# Patient Record
Sex: FEMALE | Race: BLACK OR AFRICAN AMERICAN | NOT HISPANIC OR LATINO | Employment: STUDENT | URBAN - METROPOLITAN AREA
[De-identification: names, ages, dates, MRNs, and addresses within clinical notes are randomized per-mention and may not be internally consistent; named-entity substitution may affect disease eponyms.]

---
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
## 2024-01-19 ENCOUNTER — TELEPHONE (OUTPATIENT)
Dept: OBSTETRICS AND GYNECOLOGY | Facility: CLINIC | Age: 19
End: 2024-01-19
Payer: MEDICAID

## 2024-01-19 NOTE — TELEPHONE ENCOUNTER
Attempted to call patient to reschedule appointment. Patient did not answer, and voicemail was unable to be left due to patient's phone not being in service.

## 2024-01-23 ENCOUNTER — OFFICE VISIT (OUTPATIENT)
Dept: OBSTETRICS AND GYNECOLOGY | Facility: CLINIC | Age: 19
End: 2024-01-23
Payer: MEDICAID

## 2024-01-23 ENCOUNTER — LAB VISIT (OUTPATIENT)
Dept: LAB | Facility: HOSPITAL | Age: 19
End: 2024-01-23
Attending: NURSE PRACTITIONER
Payer: MEDICAID

## 2024-01-23 VITALS
DIASTOLIC BLOOD PRESSURE: 68 MMHG | WEIGHT: 149.94 LBS | BODY MASS INDEX: 22.21 KG/M2 | HEIGHT: 69 IN | SYSTOLIC BLOOD PRESSURE: 106 MMHG

## 2024-01-23 DIAGNOSIS — N76.0 BV (BACTERIAL VAGINOSIS): ICD-10-CM

## 2024-01-23 DIAGNOSIS — N76.2 ACUTE VULVITIS: ICD-10-CM

## 2024-01-23 DIAGNOSIS — Z30.42 ENCOUNTER FOR DEPO-PROVERA CONTRACEPTION: ICD-10-CM

## 2024-01-23 DIAGNOSIS — Z11.3 SCREENING FOR STD (SEXUALLY TRANSMITTED DISEASE): ICD-10-CM

## 2024-01-23 DIAGNOSIS — B96.89 BV (BACTERIAL VAGINOSIS): ICD-10-CM

## 2024-01-23 DIAGNOSIS — Z11.3 SCREENING FOR STD (SEXUALLY TRANSMITTED DISEASE): Primary | ICD-10-CM

## 2024-01-23 PROCEDURE — 99214 OFFICE O/P EST MOD 30 MIN: CPT | Mod: S$PBB,,, | Performed by: NURSE PRACTITIONER

## 2024-01-23 PROCEDURE — 1159F MED LIST DOCD IN RCRD: CPT | Mod: CPTII,,, | Performed by: NURSE PRACTITIONER

## 2024-01-23 PROCEDURE — 3078F DIAST BP <80 MM HG: CPT | Mod: CPTII,,, | Performed by: NURSE PRACTITIONER

## 2024-01-23 PROCEDURE — 1160F RVW MEDS BY RX/DR IN RCRD: CPT | Mod: CPTII,,, | Performed by: NURSE PRACTITIONER

## 2024-01-23 PROCEDURE — 36415 COLL VENOUS BLD VENIPUNCTURE: CPT | Performed by: NURSE PRACTITIONER

## 2024-01-23 PROCEDURE — 87389 HIV-1 AG W/HIV-1&-2 AB AG IA: CPT | Performed by: NURSE PRACTITIONER

## 2024-01-23 PROCEDURE — 99999PBSHW PR PBB SHADOW TECHNICAL ONLY FILED TO HB: Mod: PBBFAC,,,

## 2024-01-23 PROCEDURE — 3074F SYST BP LT 130 MM HG: CPT | Mod: CPTII,,, | Performed by: NURSE PRACTITIONER

## 2024-01-23 PROCEDURE — 99999 PR PBB SHADOW E&M-EST. PATIENT-LVL III: CPT | Mod: PBBFAC,,, | Performed by: NURSE PRACTITIONER

## 2024-01-23 PROCEDURE — 80074 ACUTE HEPATITIS PANEL: CPT | Performed by: NURSE PRACTITIONER

## 2024-01-23 PROCEDURE — 3008F BODY MASS INDEX DOCD: CPT | Mod: CPTII,,, | Performed by: NURSE PRACTITIONER

## 2024-01-23 PROCEDURE — 87491 CHLMYD TRACH DNA AMP PROBE: CPT | Performed by: NURSE PRACTITIONER

## 2024-01-23 PROCEDURE — 86592 SYPHILIS TEST NON-TREP QUAL: CPT | Performed by: NURSE PRACTITIONER

## 2024-01-23 PROCEDURE — 99213 OFFICE O/P EST LOW 20 MIN: CPT | Mod: PBBFAC | Performed by: NURSE PRACTITIONER

## 2024-01-23 PROCEDURE — 87210 SMEAR WET MOUNT SALINE/INK: CPT | Mod: PBBFAC | Performed by: NURSE PRACTITIONER

## 2024-01-23 RX ORDER — METRONIDAZOLE 500 MG/1
500 TABLET ORAL EVERY 12 HOURS
Qty: 14 TABLET | Refills: 0 | Status: SHIPPED | OUTPATIENT
Start: 2024-01-23 | End: 2024-01-30

## 2024-01-23 RX ORDER — MEDROXYPROGESTERONE ACETATE 150 MG/ML
150 INJECTION, SUSPENSION INTRAMUSCULAR
Status: ACTIVE | OUTPATIENT
Start: 2024-01-29 | End: 2025-04-22

## 2024-01-23 NOTE — PROGRESS NOTES
"Rachelle Peters is a 18 y.o. female  presents with complaint of vaginal discharge for 3 weeks.  She reports itching.  denies odor.  She states the discharge is white.    Wants full STD testing done   Wants to get back on depo provera - has been off for a year    LMP 2024    History reviewed. No pertinent past medical history.  History reviewed. No pertinent surgical history.  Social History     Tobacco Use    Smoking status: Never    Smokeless tobacco: Never   Substance Use Topics    Alcohol use: Never    Drug use: Never     History reviewed. No pertinent family history.  OB History    Para Term  AB Living   1 0 0 0 1 0   SAB IAB Ectopic Multiple Live Births   1 0 0 0 0      # Outcome Date GA Lbr David/2nd Weight Sex Delivery Anes PTL Lv   1 SAB                /68 (BP Location: Left arm, Patient Position: Sitting, BP Method: Small (Manual))   Ht 5' 9" (1.753 m)   Wt 68 kg (149 lb 14.6 oz)   LMP 2024   BMI 22.14 kg/m²     ROS:  Per hpi      PHYSICAL EXAM:  VULVA: normal appearing vulva with no masses, tenderness or lesions, VAGINA: vaginal discharge - white, WET MOUNT done - results: clue cells, CERVIX: normal appearing cervix without discharge or lesions, DNA probe for chlamydia and GC obtained  Physical Exam     ASSESSMENT and PLAN:  1. Screening for STD (sexually transmitted disease)  C. trachomatis/N. gonorrhoeae by AMP DNA    HIV 1/2 Ag/Ab (4th Gen)    Hepatitis Panel, Acute    RPR    POCT Wet Prep      2. Acute vulvitis  POCT Wet Prep    metroNIDAZOLE (FLAGYL) 500 MG tablet      3. BV (bacterial vaginosis)  metroNIDAZOLE (FLAGYL) 500 MG tablet      4. Encounter for Depo-Provera contraception  POCT Urine Pregnancy    medroxyPROGESTERone (DEPO-PROVERA) injection 150 mg        Rachelle was seen today for std check.    Diagnoses and all orders for this visit:    Screening for STD (sexually transmitted disease)  -     C. trachomatis/N. gonorrhoeae by AMP DNA  -     HIV 1/2 " Ag/Ab (4th Gen); Future  -     Hepatitis Panel, Acute; Future  -     RPR; Future  -     POCT Wet Prep    Acute vulvitis  -     POCT Wet Prep  -     metroNIDAZOLE (FLAGYL) 500 MG tablet; Take 1 tablet (500 mg total) by mouth every 12 (twelve) hours. for 7 days    BV (bacterial vaginosis)  -     metroNIDAZOLE (FLAGYL) 500 MG tablet; Take 1 tablet (500 mg total) by mouth every 12 (twelve) hours. for 7 days    Encounter for Depo-Provera contraception  -     POCT Urine Pregnancy; Future  -     medroxyPROGESTERone (DEPO-PROVERA) injection 150 mg     Call when on cycle for depo injection with negative upt

## 2024-01-24 LAB — RPR SER QL: NORMAL

## 2024-01-31 ENCOUNTER — TELEPHONE (OUTPATIENT)
Dept: OBSTETRICS AND GYNECOLOGY | Facility: CLINIC | Age: 19
End: 2024-01-31
Payer: MEDICAID

## 2024-01-31 ENCOUNTER — PATIENT MESSAGE (OUTPATIENT)
Dept: OBSTETRICS AND GYNECOLOGY | Facility: CLINIC | Age: 19
End: 2024-01-31
Payer: MEDICAID

## 2024-01-31 LAB
C TRACH DNA SPEC QL NAA+PROBE: NOT DETECTED
N GONORRHOEA DNA SPEC QL NAA+PROBE: NOT DETECTED

## 2024-01-31 NOTE — TELEPHONE ENCOUNTER
----- Message from Bethany Preston sent at 1/31/2024 12:47 PM CST -----  Contact: Rachelle [facility] calling to schedule appointment for depo shot. Patient was told to call when her cycle start. Please call back  [phone]

## 2024-01-31 NOTE — TELEPHONE ENCOUNTER
Please call pt to schedule a depo provera injection and upt at the [facility] as she is on her cycle   Orders are in for both

## 2024-02-02 ENCOUNTER — CLINICAL SUPPORT (OUTPATIENT)
Dept: OBSTETRICS AND GYNECOLOGY | Facility: CLINIC | Age: 19
End: 2024-02-02
Payer: MEDICAID

## 2024-02-02 DIAGNOSIS — Z30.9 ENCOUNTER FOR CONTRACEPTIVE MANAGEMENT, UNSPECIFIED TYPE: Primary | ICD-10-CM

## 2024-02-02 LAB
B-HCG UR QL: NEGATIVE
CTP QC/QA: YES

## 2024-02-02 PROCEDURE — 96372 THER/PROPH/DIAG INJ SC/IM: CPT | Mod: PBBFAC,PN

## 2024-02-02 PROCEDURE — 81025 URINE PREGNANCY TEST: CPT | Mod: PBBFAC,PN

## 2024-02-02 PROCEDURE — 99999PBSHW POCT URINE PREGNANCY: Mod: PBBFAC,,,

## 2024-02-02 PROCEDURE — 99999PBSHW PR PBB SHADOW TECHNICAL ONLY FILED TO HB: Mod: PBBFAC,,,

## 2024-02-02 RX ADMIN — MEDROXYPROGESTERONE ACETATE 150 MG: 150 INJECTION, SUSPENSION INTRAMUSCULAR at 11:02

## 2024-02-02 NOTE — PROGRESS NOTES
After having negative UPT and using two patient identifiers allergies and medications were reviewed and depo provera injection was given IM to Left Ventrogluteal. Instructed to wait 15 minutes. Made next appointment.

## 2024-09-03 ENCOUNTER — PATIENT MESSAGE (OUTPATIENT)
Dept: OBSTETRICS AND GYNECOLOGY | Facility: CLINIC | Age: 19
End: 2024-09-03
Payer: MEDICAID

## 2024-10-03 ENCOUNTER — E-VISIT (OUTPATIENT)
Dept: FAMILY MEDICINE | Facility: CLINIC | Age: 19
End: 2024-10-03
Payer: MEDICAID

## 2024-10-03 DIAGNOSIS — L70.9 ACNE, UNSPECIFIED ACNE TYPE: Primary | ICD-10-CM

## 2024-10-03 DIAGNOSIS — L98.9 SKIN LESION: ICD-10-CM

## 2024-10-03 RX ORDER — CLINDAMYCIN PHOSPHATE AND BENZOYL PEROXIDE 10; 50 MG/G; MG/G
GEL TOPICAL
Qty: 45 G | Refills: 2 | Status: SHIPPED | OUTPATIENT
Start: 2024-10-03

## 2024-10-03 RX ORDER — DOXYCYCLINE HYCLATE 100 MG
100 TABLET ORAL DAILY
Qty: 14 TABLET | Refills: 0 | Status: SHIPPED | OUTPATIENT
Start: 2024-10-03 | End: 2024-10-17

## 2024-10-03 NOTE — PROGRESS NOTES
Patient ID: Rachelle Peters is a 19 y.o. female.    Chief Complaint: General Illness (Entered automatically based on patient selection in [employer].)          274}  The patient initiated a request through [employer] on 10/3/2024 for evaluation and management with a chief complaint of General Illness (Entered automatically based on patient selection in [employer].)     I evaluated the questionnaire submission on 10/04/2024 .    Total Time (in minutes): 16     [facility]-Skin Hair Nails    10/3/2024 12:21 PM CDT - Filed by Patient   What do you need help with? Skin   What concern do you have about your skin? Scar   Do you agree to participate in an E-Visit? Yes   If you have any of the following symptoms, please present to your local emergency room or call 911:  I acknowledge   Are you pregnant, could you be pregnant, or are you breast feeding? None of the above   What is the main issue you would like addressed today? Acne , dark spots   How would you describe your skin problem? Growth   When did your symptoms first appear? 7/22/2023   Where is it located?  Face   Does it itch? Yes   Does it hurt? No   Is there discharge or drainage? No   Is there bleeding? Yes   Describe the character Spots   Describe the color Black   Has it changed over time? Shrunk in size   Frequency of skin problem Monthly   Duration of the skin problem (how long does it stay when it is present) Hours   I have had a new exposure to No new exposures   I have had a new exposure to No new exposures   What have you used to treat the skin problem? None   If you have used anything for treatment, has it helped the symptoms? Maybe   Other generalized symptoms that you associate with the rash No other symptoms   Provide any additional information you feel is important. Just need to get some cream to get rid of acne   At least one photo is required for treatment to be provided. You can upload a maximum of three photos of the affected area.      Are you able to take your vital signs? No          Active Problem List with Overview Notes    Diagnosis Date Noted    Irregular periods 09/12/2022      Recent Labs Obtained:  Lab Results   Component Value Date    WBC 6.51 04/06/2022    HGB 7.4 (L) 04/06/2022    HCT 31.0 (L) 04/06/2022    MCV 67 (L) 04/06/2022     (H) 04/06/2022     04/06/2022    K 3.7 04/06/2022    GLU 70 04/06/2022    CREATININE 0.8 04/06/2022    HGBA1C 4.7 04/06/2022    TSH 1.853 04/06/2022      Review of patient's allergies indicates:  No Known Allergies    Encounter Diagnoses   Name Primary?    Acne, unspecified acne type Yes    Skin lesion         Orders Placed This Encounter   Procedures    Ambulatory referral/consult to Dermatology     Standing Status:   Future     Standing Expiration Date:   11/3/2025     Referral Priority:   Routine     Referral Type:   Consultation     Referral Reason:   Specialty Services Required     Requested Specialty:   Dermatology     Number of Visits Requested:   1    E-Consult to Dermatology     Order Specific Question:   Consent has been obtained from patient, and patient is aware of applicable cost? Pending specialist evaluation, I will follow up with the patient.     Answer:   Yes     Order Specific Question:   Medical Condition:     Answer:   Acne     Order Specific Question:   What is your clinical question?     Answer:   Pt has hyperpigmented spots from acne any recommended medication to help with dark spots/scarring     Order Specific Question:   Total time spent preparing for the referral and/or communicating with the consulting physician:     Answer:   <16 minutes: no billing code should be submitted      Medications Ordered This Encounter   Medications    clindamycin-benzoyl peroxide gel     Sig: AAA face qam to bid     Dispense:  45 g     Refill:  2    doxycycline (VIBRA-TABS) 100 MG tablet     Sig: Take 1 tablet (100 mg total) by mouth once daily. for 14 days     Dispense:  14 tablet      Refill:  0    hydroquinone 4 % Crea     Sig: Apply sparingly to affected areas of face BID prn dark spots. Do not use for longer than 16 weeks.     Dispense:  30 g     Refill:  1        E-Visit Time Tracking:    Day 1 Time (in minutes): 12  Day 2 Time (in minutes): 4    Total Time (in minutes): 16      274}

## 2024-10-04 ENCOUNTER — E-CONSULT (OUTPATIENT)
Dept: DERMATOLOGY | Facility: CLINIC | Age: 19
End: 2024-10-04
Payer: MEDICAID

## 2024-10-04 DIAGNOSIS — L81.9 POST-INFLAMMATORY PIGMENTARY CHANGES: Primary | ICD-10-CM

## 2024-10-04 RX ORDER — HYDROQUINONE 40 MG/G
CREAM TOPICAL
Qty: 30 G | Refills: 1 | Status: SHIPPED | OUTPATIENT
Start: 2024-10-04

## 2024-10-04 NOTE — CONSULTS
[address]  Response for E-Consult     Patient Name: Rachelle Peters  MRN: [id number]  Primary Care Provider: No, Primary Doctor   Requesting Provider: Don Patel MD  Consults    Recommendation: Management of post inflammatory pigment alteration (PIPA):  - Strict sun protection (daily application of at least SPF 30, broad spectrum, ideally a tinted physical blocker (zinc or titanium as the active ingredient), with frequent reapplication (every 2 hours if outside), such as EltaMD UV Clear SPF 46- Tinted, or CeraVe Tinted Sunscreen SPF 30.    - can also try a lightening cream with hydroquinone, such as prescription hydroquinone 4% cream, applied BID for 2 months on, then must take at least 1 month off to prevent risk of ochronosis.  Do not use continuously.  - prevention of additional PIPA by treating acne      Contingency that warrants a repeat eConsult or referral: no improvement with above recommendations    Total time of Consultation: 5 minute    I did not speak to the requesting provider verbally about this.     *This eConsult is based on the clinical data available to me and is furnished without benefit of a physical examination. The eConsult will need to be interpreted in light of any clinical issues or changes in patient status not available to me at the time of filing this eConsults. Significant changes in patient condition or level of acuity should result in immediate formal consultation and reevaluation. Please alert me if you have further questions.    Thank you for this eConsult referral.     Theresa Donaldson MD  [address]

## 2024-10-15 ENCOUNTER — LAB VISIT (OUTPATIENT)
Dept: LAB | Facility: HOSPITAL | Age: 19
End: 2024-10-15
Attending: NURSE PRACTITIONER
Payer: MEDICAID

## 2024-10-15 ENCOUNTER — OFFICE VISIT (OUTPATIENT)
Dept: OBSTETRICS AND GYNECOLOGY | Facility: CLINIC | Age: 19
End: 2024-10-15
Payer: MEDICAID

## 2024-10-15 VITALS
HEIGHT: 69 IN | DIASTOLIC BLOOD PRESSURE: 66 MMHG | WEIGHT: 147.25 LBS | SYSTOLIC BLOOD PRESSURE: 114 MMHG | BODY MASS INDEX: 21.81 KG/M2

## 2024-10-15 DIAGNOSIS — Z11.3 SCREENING FOR STD (SEXUALLY TRANSMITTED DISEASE): ICD-10-CM

## 2024-10-15 DIAGNOSIS — Z11.3 SCREENING FOR STD (SEXUALLY TRANSMITTED DISEASE): Primary | ICD-10-CM

## 2024-10-15 DIAGNOSIS — R35.0 URINE FREQUENCY: ICD-10-CM

## 2024-10-15 LAB
BACTERIA #/AREA URNS HPF: ABNORMAL /HPF
BILIRUB UR QL STRIP: NEGATIVE
CLARITY UR: CLEAR
COLOR UR: YELLOW
GLUCOSE UR QL STRIP: NEGATIVE
HAV IGM SERPL QL IA: NORMAL
HBV CORE IGM SERPL QL IA: NORMAL
HBV SURFACE AG SERPL QL IA: NORMAL
HCV AB SERPL QL IA: NORMAL
HGB UR QL STRIP: ABNORMAL
HIV 1+2 AB+HIV1 P24 AG SERPL QL IA: NORMAL
KETONES UR QL STRIP: NEGATIVE
LEUKOCYTE ESTERASE UR QL STRIP: NEGATIVE
MICROSCOPIC COMMENT: ABNORMAL
NITRITE UR QL STRIP: NEGATIVE
PH UR STRIP: 7 [PH] (ref 5–8)
PROT UR QL STRIP: NEGATIVE
RBC #/AREA URNS HPF: 16 /HPF (ref 0–4)
SP GR UR STRIP: 1.02 (ref 1–1.03)
SQUAMOUS #/AREA URNS HPF: 2 /HPF
TREPONEMA PALLIDUM IGG+IGM AB [PRESENCE] IN SERUM OR PLASMA BY IMMUNOASSAY: NONREACTIVE
URN SPEC COLLECT METH UR: ABNORMAL
WBC #/AREA URNS HPF: 1 /HPF (ref 0–5)

## 2024-10-15 PROCEDURE — 87086 URINE CULTURE/COLONY COUNT: CPT | Performed by: NURSE PRACTITIONER

## 2024-10-15 PROCEDURE — 3008F BODY MASS INDEX DOCD: CPT | Mod: CPTII,,, | Performed by: NURSE PRACTITIONER

## 2024-10-15 PROCEDURE — 1159F MED LIST DOCD IN RCRD: CPT | Mod: CPTII,,, | Performed by: NURSE PRACTITIONER

## 2024-10-15 PROCEDURE — 1160F RVW MEDS BY RX/DR IN RCRD: CPT | Mod: CPTII,,, | Performed by: NURSE PRACTITIONER

## 2024-10-15 PROCEDURE — 87591 N.GONORRHOEAE DNA AMP PROB: CPT | Performed by: NURSE PRACTITIONER

## 2024-10-15 PROCEDURE — 80074 ACUTE HEPATITIS PANEL: CPT | Performed by: NURSE PRACTITIONER

## 2024-10-15 PROCEDURE — 81000 URINALYSIS NONAUTO W/SCOPE: CPT | Performed by: NURSE PRACTITIONER

## 2024-10-15 PROCEDURE — 87389 HIV-1 AG W/HIV-1&-2 AB AG IA: CPT | Performed by: NURSE PRACTITIONER

## 2024-10-15 PROCEDURE — 3074F SYST BP LT 130 MM HG: CPT | Mod: CPTII,,, | Performed by: NURSE PRACTITIONER

## 2024-10-15 PROCEDURE — 86593 SYPHILIS TEST NON-TREP QUANT: CPT | Performed by: NURSE PRACTITIONER

## 2024-10-15 PROCEDURE — 99999 PR PBB SHADOW E&M-EST. PATIENT-LVL II: CPT | Mod: PBBFAC,,, | Performed by: NURSE PRACTITIONER

## 2024-10-15 PROCEDURE — 99213 OFFICE O/P EST LOW 20 MIN: CPT | Mod: S$PBB,,, | Performed by: NURSE PRACTITIONER

## 2024-10-15 PROCEDURE — 36415 COLL VENOUS BLD VENIPUNCTURE: CPT | Performed by: NURSE PRACTITIONER

## 2024-10-15 PROCEDURE — 99212 OFFICE O/P EST SF 10 MIN: CPT | Mod: PBBFAC | Performed by: NURSE PRACTITIONER

## 2024-10-15 PROCEDURE — 3078F DIAST BP <80 MM HG: CPT | Mod: CPTII,,, | Performed by: NURSE PRACTITIONER

## 2024-10-15 PROCEDURE — 87491 CHLMYD TRACH DNA AMP PROBE: CPT | Performed by: NURSE PRACTITIONER

## 2024-10-15 NOTE — PROGRESS NOTES
"Rachelle Peters is a 19 y.o. female  presents for STD testing and some frequency with urination x 2 days    Patient's last menstrual period was 2024.    History reviewed. No pertinent past medical history.  History reviewed. No pertinent surgical history.  Social History     Tobacco Use    Smoking status: Never    Smokeless tobacco: Never   Substance Use Topics    Alcohol use: Never    Drug use: Never     No family history on file.  OB History    Para Term  AB Living   1 0 0 0 1 0   SAB IAB Ectopic Multiple Live Births   1 0 0 0 0      # Outcome Date GA Lbr David/2nd Weight Sex Type Anes PTL Lv   1 SAB                /66   Ht 5' 9" (1.753 m)   Wt 66.8 kg (147 lb 4.3 oz)   LMP 2024   BMI 21.75 kg/m²     ROS:  Per hpi    PHYSICAL EXAM:  VULVA: normal appearing vulva with no masses, tenderness or lesions, VAGINA: normal appearing vagina with normal color and discharge, no lesions, CERVIX: normal appearing cervix without discharge or lesions, DNA probe for chlamydia and GC obtained, UTERUS: uterus is normal size, shape, consistency and nontender, ADNEXA: normal adnexa in size, nontender and no masses  Physical Exam     ASSESSMENT and PLAN:  1. Screening for STD (sexually transmitted disease)  C. trachomatis/N. gonorrhoeae by AMP DNA    Treponema Pallidium Antibodies IgG, IgM    Hepatitis Panel, Acute    HIV 1/2 Ag/Ab (4th Gen)      2. Urine frequency  Urinalysis    CULTURE, URINE          Rachelle was seen today for std check.    Diagnoses and all orders for this visit:    Screening for STD (sexually transmitted disease)  -     C. trachomatis/N. gonorrhoeae by AMP DNA  -     Treponema Pallidium Antibodies IgG, IgM; Future  -     Hepatitis Panel, Acute; Future  -     HIV 1/2 Ag/Ab (4th Gen); Future    Urine frequency  -     Urinalysis  -     CULTURE, URINE         Patient was counseled today on vaginitis prevention including :  a. avoiding feminine products such as deoderant " soaps, body wash, bubble bath, douches, scented toilet paper, deoderant tampons or pads, feminine wipes, chronic pad use, etc.  b. avoiding other vulvovaginal irritants such as long hot baths, humidity, tight, synthetic clothing, chlorine and sitting around in wet bathing suits  c. wearing cotton underwear, avoiding thong underwear and no underwear to bed  d. taking showers instead of baths and use a hair dryer on cool setting afterwards to dry  e. wearing cotton to exercise and shower immediately after exercise and change clothes  f. using polyurethane condoms without spermicide if sexually active and symptoms are triggered by intercourse

## 2024-10-17 LAB
BACTERIA UR CULT: NORMAL
C TRACH DNA SPEC QL NAA+PROBE: NOT DETECTED
N GONORRHOEA DNA SPEC QL NAA+PROBE: NOT DETECTED

## 2024-10-22 ENCOUNTER — E-VISIT (OUTPATIENT)
Dept: FAMILY MEDICINE | Facility: CLINIC | Age: 19
End: 2024-10-22
Payer: MEDICAID

## 2024-10-22 DIAGNOSIS — L81.9 POST-INFLAMMATORY PIGMENTARY CHANGES: Primary | ICD-10-CM

## 2024-10-22 RX ORDER — HYDROQUINONE 40 MG/G
CREAM TOPICAL
Qty: 30 G | Refills: 1 | Status: SHIPPED | OUTPATIENT
Start: 2024-10-22

## 2024-10-22 NOTE — PROGRESS NOTES
Patient ID: Rachelle Peters is a 19 y.o. female.    Chief Complaint: General Illness (Entered automatically based on patient selection in [employer].)          274}  The patient initiated a request through [employer] on 10/22/2024 for evaluation and management with a chief complaint of General Illness (Entered automatically based on patient selection in [employer].)     I evaluated the questionnaire submission on 10/22/2024 .    Total Time (in minutes): 8     [facility] P[facility]-Skin Hair Nails    10/22/2024  9:00 AM CDT - Filed by Patient   What do you need help with? Skin   What concern do you have about your skin? Acne   Do you agree to participate in an E-Visit? Yes   If you have any of the following symptoms, please present to your local emergency room or call 911:  I acknowledge   Are you pregnant, could you be pregnant, or are you breast feeding? None of the above   What is the main issue you would like addressed today? Darks spots breaking out hyperpigmentation   How would you describe your skin problem? Rash   When did your symptoms first appear? 10/5/2024   Where is it located?  Face   Does it itch? Yes   Does it hurt? No   Is there discharge or drainage? No   Is there bleeding? No   Describe the character Spots;  Scaly   Describe the color Black   Has it changed over time? Spread to other locations   Frequency of skin problem Weekly   Duration of the skin problem (how long does it stay when it is present) Never goes away   I have had a new exposure to No new exposures   I have had a new exposure to No new exposures   What have you used to treat the skin problem? Benzoyal , t   If you have used anything for treatment, has it helped the symptoms? Maybe   Other generalized symptoms that you associate with the rash No other symptoms   Provide any additional information you feel is important. Darks spots , different color skin   At least one photo is required for treatment to be provided. You can upload a  maximum of three photos of the affected area.     Are you able to take your vital signs? No          Active Problem List with Overview Notes    Diagnosis Date Noted    Irregular periods 09/12/2022      Recent Labs Obtained:  Lab Results   Component Value Date    WBC 6.51 04/06/2022    HGB 7.4 (L) 04/06/2022    HCT 31.0 (L) 04/06/2022    MCV 67 (L) 04/06/2022     (H) 04/06/2022     04/06/2022    K 3.7 04/06/2022    GLU 70 04/06/2022    CREATININE 0.8 04/06/2022    HGBA1C 4.7 04/06/2022    TSH 1.853 04/06/2022      Review of patient's allergies indicates:  No Known Allergies    Encounter Diagnosis   Name Primary?    Post-inflammatory pigmentary changes Yes        No orders of the defined types were placed in this encounter.     Medications Ordered This Encounter   Medications    hydroquinone 4 % Crea     Sig: Apply sparingly to affected areas of face BID prn dark spots. Do not use for longer than 16 weeks.     Dispense:  30 g     Refill:  1        E-Visit Time Tracking:    Day 1 Time (in minutes): 8    Total Time (in minutes): 8      274}

## 2025-01-24 ENCOUNTER — E-VISIT (OUTPATIENT)
Dept: FAMILY MEDICINE | Facility: CLINIC | Age: 20
End: 2025-01-24
Payer: MEDICAID

## 2025-01-24 DIAGNOSIS — N30.90 CYSTITIS WITHOUT HEMATURIA: Primary | ICD-10-CM

## 2025-01-24 PROCEDURE — 99423 OL DIG E/M SVC 21+ MIN: CPT | Mod: ,,, | Performed by: STUDENT IN AN ORGANIZED HEALTH CARE EDUCATION/TRAINING PROGRAM

## 2025-01-24 RX ORDER — SULFAMETHOXAZOLE AND TRIMETHOPRIM 800; 160 MG/1; MG/1
1 TABLET ORAL 2 TIMES DAILY
Qty: 14 TABLET | Refills: 0 | Status: SHIPPED | OUTPATIENT
Start: 2025-01-24 | End: 2025-01-31

## 2025-01-24 NOTE — PROGRESS NOTES
Patient ID: Rachelle Peters is a 19 y.o. female.    Chief Complaint: General Illness (Entered automatically based on patient selection in [employer].)          274}  The patient initiated a request through [employer] on 1/24/2025 for evaluation and management with a chief complaint of General Illness (Entered automatically based on patient selection in [employer].)     I evaluated the questionnaire submission on 01/24/2025 .    Total Time (in minutes): 11     [facility]    1/24/2025  8:36 AM CST - Filed by Patient   What do you need help with? Urinary Symptoms   Do you agree to participate in an E-Visit? Yes   If you have any of the following symptoms, please present to your local emergency room or call 911:  I acknowledge   Do you have any of the following pregnancy-related conditions? None   What is the main issue you would like addressed today? Peeing alot , back to back   What symptoms do you currently have? Change in urine appearance or smell   When did your symptoms first appear? 12/20/2025   List what you have done or taken to help your symptoms. Drink water   Please indicate whether you have had the following symptoms during the past 24 hours     Urgent urination (a sudden and uncontrollable urge to urinate) Moderate   Frequent urination of small amounts of urine (going to the toilet very often) Moderate   Burning pain when urinating Mild   Incomplete bladder emptying (still feel like you need to urinate again after urination) Moderate   Pain not associated with urination in the lower abdomen below the belly button) Mild   What does your urine look like? Cloudy   Blood seen in the urine None   Flank pain (pain in one or both sides of the lower back) Mild   Abnormal Vaginal Discharge (abnormal amount, color and/or odor) Mild   Discharge from the urethra (urinary opening) without urination Mild   High body temperature/fever? None-<99.5   Please rate how much discomfort you have  experience because of the symptoms in the past 24 hours: Mild   Please indicate how the symptoms have interfered with your every day activities/work in the past 24 hours: Mild   Please indicate how these symptoms have interfered with your social activities (visiting people, meeting with friends, etc.) in the past 24 hours? Mild   Are you a diabetic? No   Please indicate whether you have the following at the time of completion of this questionnaire: None of the above   Provide any additional information you feel is important. Jusr been peeing alot and , and pee smell odor   Please attach any relevant images or files (if you have performed a home test for UTI, please submit a photo of results)    Are you able to take your vital signs? No          Active Problem List with Overview Notes    Diagnosis Date Noted    Irregular periods 09/12/2022      Recent Labs Obtained:  Lab Results   Component Value Date    WBC 6.51 04/06/2022    HGB 7.4 (L) 04/06/2022    HCT 31.0 (L) 04/06/2022    MCV 67 (L) 04/06/2022     (H) 04/06/2022     04/06/2022    K 3.7 04/06/2022    GLU 70 04/06/2022    CREATININE 0.8 04/06/2022    HGBA1C 4.7 04/06/2022    TSH 1.853 04/06/2022      Review of patient's allergies indicates:  No Known Allergies    Encounter Diagnosis   Name Primary?    Cystitis without hematuria Yes        Orders Placed This Encounter   Procedures    Urinalysis, Reflex to Urine Culture Urine, Clean Catch     Standing Status:   Future     Standing Expiration Date:   3/25/2026     Order Specific Question:   Preferred Collection Type     Answer:   Urine, Clean Catch     Order Specific Question:   Specimen Source     Answer:   Urine      Medications Ordered This Encounter   Medications    sulfamethoxazole-trimethoprim 800-160mg (BACTRIM DS) 800-160 mg Tab     Sig: Take 1 tablet by mouth 2 (two) times daily. for 7 days     Dispense:  14 tablet     Refill:  0        E-Visit Time Tracking:    Day 1 Time (in minutes):  11    Total Time (in minutes): 11      938}

## 2025-01-29 ENCOUNTER — E-VISIT (OUTPATIENT)
Dept: FAMILY MEDICINE | Facility: CLINIC | Age: 20
End: 2025-01-29
Payer: MEDICAID

## 2025-01-29 DIAGNOSIS — R21 RASH: Primary | ICD-10-CM

## 2025-01-29 RX ORDER — HYDROCORTISONE 25 MG/G
CREAM TOPICAL
Qty: 28 G | Refills: 1 | Status: SHIPPED | OUTPATIENT
Start: 2025-01-29

## 2025-01-29 RX ORDER — METRONIDAZOLE 7.5 MG/G
GEL TOPICAL
Qty: 45 G | Refills: 2 | Status: SHIPPED | OUTPATIENT
Start: 2025-01-29

## 2025-01-29 NOTE — PROGRESS NOTES
Patient ID: Rachelle Peters is a 19 y.o. female.    Chief Complaint: General Illness (Entered automatically based on patient selection in [employer].)          274}  The patient initiated a request through [employer] on 1/29/2025 for evaluation and management with a chief complaint of General Illness (Entered automatically based on patient selection in [employer].)     I evaluated the questionnaire submission on 01/29/2025 .    Total Time (in minutes): 11     [facility] Peq E[facility]Skin Hair Nails    1/29/2025  9:33 AM CST - Filed by Patient   What do you need help with? Skin   What concern do you have about your skin? Eczema   Do you agree to participate in an E-Visit? Yes   If you have any of the following symptoms, please present to your local emergency room or call 911:  I acknowledge   Do you have any of the following pregnancy-related conditions? None   What is the main issue you would like addressed today? My skin is flaring up like dry flakes and causing discoloration   How would you describe your skin problem? Rash   When did your symptoms first appear? 1/20/2025   Where is it located?  Face;  Neck   Does it itch? Yes   Does it hurt? No   Is there discharge or drainage? No   Is there bleeding? No   Describe the character Spots;  Scaly   Describe the color White   Has it changed over time? No change   Frequency of skin problem Monthly   Duration of the skin problem (how long does it stay when it is present) Days   I have had a new exposure to No new exposures   I have had a new exposure to No new exposures   What have you used to treat the skin problem? Nothing but panxoyl   If you have used anything for treatment, has it helped the symptoms? Maybe   Other generalized symptoms that you associate with the rash No other symptoms   Provide any additional information you feel is important.    At least one photo is required for treatment to be provided. You can upload a maximum of three photos of the affected area.      Are you able to take your vital signs? No          Active Problem List with Overview Notes    Diagnosis Date Noted    Irregular periods 09/12/2022      Recent Labs Obtained:  Lab Results   Component Value Date    WBC 6.51 04/06/2022    HGB 7.4 (L) 04/06/2022    HCT 31.0 (L) 04/06/2022    MCV 67 (L) 04/06/2022     (H) 04/06/2022     04/06/2022    K 3.7 04/06/2022    GLU 70 04/06/2022    CREATININE 0.8 04/06/2022    HGBA1C 4.7 04/06/2022    TSH 1.853 04/06/2022      Review of patient's allergies indicates:  No Known Allergies    Encounter Diagnosis   Name Primary?    Rash Yes        No orders of the defined types were placed in this encounter.     Medications Ordered This Encounter   Medications    hydrocortisone 2.5 % cream     Sig: Thin film to AA onface daily PRN flare; use short term only     Dispense:  28 g     Refill:  1    metroNIDAZOLE (METROGEL) 0.75 % gel     Sig: Apply to face BID.     Dispense:  45 g     Refill:  2        E-Visit Time Tracking:    Day 1 Time (in minutes): 11    Total Time (in minutes): 11      274}

## 2025-02-19 ENCOUNTER — E-VISIT (OUTPATIENT)
Dept: URGENT CARE | Facility: CLINIC | Age: 20
End: 2025-02-19
Payer: MEDICAID

## 2025-02-19 DIAGNOSIS — L81.9 HYPERPIGMENTATION: Primary | ICD-10-CM

## 2025-02-19 RX ORDER — TRETINOIN 0.25 MG/G
CREAM TOPICAL
Qty: 20 G | Refills: 2 | Status: SHIPPED | OUTPATIENT
Start: 2025-02-19

## 2025-02-19 NOTE — PROGRESS NOTES
Patient ID: Rachelle Peters is a 19 y.o. female.    Chief Complaint: General Illness (Entered automatically based on patient selection in [employer].)          274}  The patient initiated a request through [employer] on 2/19/2025 for evaluation and management with a chief complaint of General Illness (Entered automatically based on patient selection in [employer].)     I evaluated the questionnaire submission on 02/19/2025 .    Total Time (in minutes): 7     [facility] P[facility]Skin Hair Nails    2/19/2025  2:24 PM CST - Filed by Patient   What do you need help with? Skin   What concern do you have about your skin? Scar   Do you agree to participate in an E-Visit? Yes   If you have any of the following symptoms, please present to your local emergency room or call 911:  I acknowledge   Do you have any of the following pregnancy-related conditions? None   What is the main issue you would like addressed today? Hyperpigmentation and darkening   How would you describe your skin problem? Growth   When did your symptoms first appear? 2/10/2025   Where is it located?  Face;  Neck   Does it itch? No   Does it hurt? No   Is there discharge or drainage? No   Is there bleeding? No   Describe the character Spots   Describe the color Black   Has it changed over time? No change   Frequency of skin problem Monthly   Duration of the skin problem (how long does it stay when it is present) Never goes away   I have had a new exposure to No new exposures   I have had a new exposure to No new exposures   What have you used to treat the skin problem? Lightning cream such as trention helps but i ran out   If you have used anything for treatment, has it helped the symptoms? Yes   Other generalized symptoms that you associate with the rash No other symptoms   Provide any additional information you feel is important. I fell i need some more trention   At least one photo is required for treatment to be provided. You can upload a maximum of  three photos of the affected area.     Are you able to take your vital signs? No          Active Problem List with Overview Notes    Diagnosis Date Noted    Irregular periods 09/12/2022      Recent Labs Obtained:  Lab Results   Component Value Date    WBC 6.51 04/06/2022    HGB 7.4 (L) 04/06/2022    HCT 31.0 (L) 04/06/2022    MCV 67 (L) 04/06/2022     (H) 04/06/2022     04/06/2022    K 3.7 04/06/2022    GLU 70 04/06/2022    CREATININE 0.8 04/06/2022    HGBA1C 4.7 04/06/2022    TSH 1.853 04/06/2022      Review of patient's allergies indicates:  No Known Allergies    Encounter Diagnosis   Name Primary?    Hyperpigmentation Yes        No orders of the defined types were placed in this encounter.     Medications Ordered This Encounter   Medications    tretinoin (RETIN-A) 0.025 % cream     Sig: Apply a pea-sized amount to entire face qhs.     Dispense:  20 g     Refill:  2        E-Visit Time Tracking:    Day 1 Time (in minutes): 7    Total Time (in minutes): 7      274}

## 2025-04-08 ENCOUNTER — E-VISIT (OUTPATIENT)
Dept: FAMILY MEDICINE | Facility: CLINIC | Age: 20
End: 2025-04-08
Payer: MEDICAID

## 2025-04-08 DIAGNOSIS — L30.9 ECZEMA, UNSPECIFIED TYPE: Primary | ICD-10-CM

## 2025-04-08 DIAGNOSIS — R21 RASH: ICD-10-CM

## 2025-04-08 RX ORDER — HYDROCORTISONE 25 MG/G
CREAM TOPICAL
Qty: 28 G | Refills: 1 | Status: SHIPPED | OUTPATIENT
Start: 2025-04-08

## 2025-04-08 RX ORDER — TRIAMCINOLONE ACETONIDE 1 MG/G
CREAM TOPICAL
Qty: 454 G | Refills: 0 | Status: SHIPPED | OUTPATIENT
Start: 2025-04-08

## 2025-04-08 NOTE — PROGRESS NOTES
Patient ID: Rachelle Peters is a 19 y.o. female.    Chief Complaint: General Illness (Entered automatically based on patient selection in [employer].)          274}  The patient initiated a request through [employer] on 4/8/2025 for evaluation and management with a chief complaint of General Illness (Entered automatically based on patient selection in [employer].)     I evaluated the questionnaire submission on 04/08/2025 .    Total Time (in minutes): 6     [facility] Peq E[facility] Hair Nails    4/8/2025  1:47 PM CDT - Filed by Patient   What do you need help with? Skin   What concern do you have about your skin? Eczema   Do you agree to participate in an E-Visit? Yes   If you have any of the following symptoms, please present to your local emergency room or call 911:  I acknowledge   Do you have any of the following pregnancy-related conditions? Pregnant   What is the main issue you would like addressed today? My eczema is acting up and i need my cream real bad   How would you describe your skin concern? Rash   When did your concern begin? 3/29/2025   Where is your skin concern located? Face;  Neck   Does the affected area itch? No   Does the affected area hurt? No   Does the affected area have discharge or drainage? No   Have you noticed any bleeding in the affected area? No   How would you describe your skin concern? Spots;  Scaly   How would you describe the color of the affected area(s)? Darker than skin   How has the affected area changed over time? Spread to other areas   How often do you have this skin concern? Monthly   How long does your skin problem last? Weeks   Have you been exposed to any of the following? None   Have you used any of the following to treat your skin concern? Steroid cream or ointment   If you have used anything for treatment, has it helped the symptoms? Yes   Do you have any of the following additional symptoms with your skin concern? None   Provide any additional information you feel  is important. I use steroid cream because my eczema causes my face to darken   At least one photo is required for treatment to be provided. You can upload a maximum of three photos of the affected area.     Are you able to take your vital signs? No          Active Problem List with Overview Notes    Diagnosis Date Noted    Irregular periods 09/12/2022      Recent Labs Obtained:  Lab Results   Component Value Date    WBC 6.51 04/06/2022    HGB 7.4 (L) 04/06/2022    HCT 31.0 (L) 04/06/2022    MCV 67 (L) 04/06/2022     (H) 04/06/2022     04/06/2022    K 3.7 04/06/2022    GLU 70 04/06/2022    CREATININE 0.8 04/06/2022    HGBA1C 4.7 04/06/2022    TSH 1.853 04/06/2022      Review of patient's allergies indicates:  No Known Allergies    Encounter Diagnoses   Name Primary?    Rash     Eczema, unspecified type Yes        No orders of the defined types were placed in this encounter.     Medications Ordered This Encounter   Medications    hydrocortisone 2.5 % cream     Sig: Thin film to AA onface daily PRN flare; use short term only     Dispense:  28 g     Refill:  1    triamcinolone acetonide 0.1% (KENALOG) 0.1 % cream     Sig: AAA bid do not use of the face     Dispense:  454 g     Refill:  0        E-Visit Time Tracking:    Day 1 Time (in minutes): 6    Total Time (in minutes): 6      274}

## 2025-04-10 ENCOUNTER — OFFICE VISIT (OUTPATIENT)
Dept: OBSTETRICS AND GYNECOLOGY | Facility: CLINIC | Age: 20
End: 2025-04-10
Payer: MEDICAID

## 2025-04-10 ENCOUNTER — LAB VISIT (OUTPATIENT)
Dept: LAB | Facility: HOSPITAL | Age: 20
End: 2025-04-10
Attending: ADVANCED PRACTICE MIDWIFE
Payer: MEDICAID

## 2025-04-10 VITALS
SYSTOLIC BLOOD PRESSURE: 126 MMHG | HEIGHT: 69 IN | DIASTOLIC BLOOD PRESSURE: 68 MMHG | BODY MASS INDEX: 21.52 KG/M2 | HEART RATE: 113 BPM | WEIGHT: 145.31 LBS

## 2025-04-10 DIAGNOSIS — O36.80X0 ENCOUNTER TO DETERMINE FETAL VIABILITY OF PREGNANCY, SINGLE OR UNSPECIFIED FETUS: ICD-10-CM

## 2025-04-10 DIAGNOSIS — Z11.59 PCR POSITIVE FOR HERPES SIMPLEX VIRUS TYPE 1 (HSV-1) DNA: ICD-10-CM

## 2025-04-10 DIAGNOSIS — Z34.80 ENCOUNTER FOR SUPERVISION OF NORMAL PREGNANCY IN MULTIGRAVIDA: ICD-10-CM

## 2025-04-10 DIAGNOSIS — Z32.01 POSITIVE PREGNANCY TEST: Primary | ICD-10-CM

## 2025-04-10 DIAGNOSIS — B00.9 PCR POSITIVE FOR HERPES SIMPLEX VIRUS TYPE 1 (HSV-1) DNA: ICD-10-CM

## 2025-04-10 LAB
ABSOLUTE EOSINOPHIL (OHS): 0.04 K/UL
ABSOLUTE MONOCYTE (OHS): 0.56 K/UL (ref 0.3–1)
ABSOLUTE NEUTROPHIL COUNT (OHS): 5.79 K/UL (ref 1.8–7.7)
ALBUMIN SERPL BCP-MCNC: 4.2 G/DL (ref 3.5–5.2)
ALP SERPL-CCNC: 75 UNIT/L (ref 40–150)
ALT SERPL W/O P-5'-P-CCNC: 18 UNIT/L (ref 10–44)
ANION GAP (OHS): 7 MMOL/L (ref 8–16)
AST SERPL-CCNC: 23 UNIT/L (ref 11–45)
B-HCG UR QL: POSITIVE
BASOPHILS # BLD AUTO: 0.05 K/UL
BASOPHILS NFR BLD AUTO: 0.6 %
BILIRUB SERPL-MCNC: 0.2 MG/DL (ref 0.1–1)
BUN SERPL-MCNC: 8 MG/DL (ref 6–20)
CALCIUM SERPL-MCNC: 9.7 MG/DL (ref 8.7–10.5)
CHLORIDE SERPL-SCNC: 108 MMOL/L (ref 95–110)
CO2 SERPL-SCNC: 21 MMOL/L (ref 23–29)
CREAT SERPL-MCNC: 0.8 MG/DL (ref 0.5–1.4)
CTP QC/QA: YES
EAG (OHS): 88 MG/DL (ref 68–131)
ERYTHROCYTE [DISTWIDTH] IN BLOOD BY AUTOMATED COUNT: 17.3 % (ref 11.5–14.5)
GFR SERPLBLD CREATININE-BSD FMLA CKD-EPI: >60 ML/MIN/1.73/M2
GLUCOSE SERPL-MCNC: 78 MG/DL (ref 70–110)
HAV IGM SERPL QL IA: NORMAL
HBA1C MFR BLD: 4.7 % (ref 4–5.6)
HBV CORE IGM SERPL QL IA: NORMAL
HBV SURFACE AG SERPL QL IA: NORMAL
HCT VFR BLD AUTO: 33.7 % (ref 37–48.5)
HCV AB SERPL QL IA: NORMAL
HGB BLD-MCNC: 9.7 GM/DL (ref 12–16)
HIV 1+2 AB+HIV1 P24 AG SERPL QL IA: NORMAL
IMM GRANULOCYTES # BLD AUTO: 0.02 K/UL (ref 0–0.04)
IMM GRANULOCYTES NFR BLD AUTO: 0.2 % (ref 0–0.5)
INDIRECT COOMBS: NORMAL
LYMPHOCYTES # BLD AUTO: 2.04 K/UL (ref 1–4.8)
MCH RBC QN AUTO: 21.8 PG (ref 27–31)
MCHC RBC AUTO-ENTMCNC: 28.8 G/DL (ref 32–36)
MCV RBC AUTO: 76 FL (ref 82–98)
NUCLEATED RBC (/100WBC) (OHS): 0 /100 WBC
PLATELET # BLD AUTO: 417 K/UL (ref 150–450)
PMV BLD AUTO: 10.9 FL (ref 9.2–12.9)
POTASSIUM SERPL-SCNC: 4.2 MMOL/L (ref 3.5–5.1)
PROT SERPL-MCNC: 8 GM/DL (ref 6–8.4)
RBC # BLD AUTO: 4.45 M/UL (ref 4–5.4)
RELATIVE EOSINOPHIL (OHS): 0.5 %
RELATIVE LYMPHOCYTE (OHS): 24 % (ref 18–48)
RELATIVE MONOCYTE (OHS): 6.6 % (ref 4–15)
RELATIVE NEUTROPHIL (OHS): 68.1 % (ref 38–73)
RH BLD: NORMAL
SODIUM SERPL-SCNC: 136 MMOL/L (ref 136–145)
SPECIMEN OUTDATE: NORMAL
T PALLIDUM IGG+IGM SER QL: NORMAL
WBC # BLD AUTO: 8.5 K/UL (ref 3.9–12.7)

## 2025-04-10 PROCEDURE — 99213 OFFICE O/P EST LOW 20 MIN: CPT | Mod: PBBFAC,TH,25 | Performed by: ADVANCED PRACTICE MIDWIFE

## 2025-04-10 PROCEDURE — 83020 HEMOGLOBIN ELECTROPHORESIS: CPT

## 2025-04-10 PROCEDURE — 86901 BLOOD TYPING SEROLOGIC RH(D): CPT | Performed by: ADVANCED PRACTICE MIDWIFE

## 2025-04-10 PROCEDURE — 87086 URINE CULTURE/COLONY COUNT: CPT | Performed by: ADVANCED PRACTICE MIDWIFE

## 2025-04-10 PROCEDURE — 86762 RUBELLA ANTIBODY: CPT

## 2025-04-10 PROCEDURE — 83036 HEMOGLOBIN GLYCOSYLATED A1C: CPT

## 2025-04-10 PROCEDURE — 80053 COMPREHEN METABOLIC PANEL: CPT

## 2025-04-10 PROCEDURE — 87389 HIV-1 AG W/HIV-1&-2 AB AG IA: CPT

## 2025-04-10 PROCEDURE — 81025 URINE PREGNANCY TEST: CPT | Mod: PBBFAC | Performed by: ADVANCED PRACTICE MIDWIFE

## 2025-04-10 PROCEDURE — 99999 PR PBB SHADOW E&M-EST. PATIENT-LVL III: CPT | Mod: PBBFAC,,, | Performed by: ADVANCED PRACTICE MIDWIFE

## 2025-04-10 PROCEDURE — 99999PBSHW POCT URINE PREGNANCY: Mod: PBBFAC,,,

## 2025-04-10 PROCEDURE — 85025 COMPLETE CBC W/AUTO DIFF WBC: CPT

## 2025-04-10 PROCEDURE — 80074 ACUTE HEPATITIS PANEL: CPT

## 2025-04-10 PROCEDURE — 36415 COLL VENOUS BLD VENIPUNCTURE: CPT

## 2025-04-10 PROCEDURE — 86593 SYPHILIS TEST NON-TREP QUANT: CPT

## 2025-04-10 NOTE — PROGRESS NOTES
"  CC: Absence of menses risk assessment     Rachelle Peters is a 19 y.o. female  presents with complaint of absence of menstruation.  She reports nausea/vomiting, Zofran Rx sent. denies abdominal pain, denies  bleeding.  UPT is positive. Pregnancy was not planned and is desired.  Partner is supportive of pregnancy.   Works at the [employer] and [employer].   Denies domestic abuse.  Denies chemical/pesticide/radiation exposure.    OB history:  OB History    Para Term  AB Living   2 0 0 0 1 0   SAB IAB Ectopic Multiple Live Births   1 0 0 0 0      # Outcome Date GA Lbr David/2nd Weight Sex Type Anes PTL Lv   2 Current            1 SAB                  Menstrual History  LMP 3/4/25, EGA 5w2d, EDC 25    History reviewed. No pertinent past medical history.  History reviewed. No pertinent surgical history.  Social History     Socioeconomic History    Marital status: Single   Tobacco Use    Smoking status: Never    Smokeless tobacco: Never   Substance and Sexual Activity    Alcohol use: Never    Drug use: Never    Sexual activity: Yes     Partners: Male     Birth control/protection: None     No family history on file.      /68 (BP Location: Left arm, Patient Position: Sitting)   Pulse (!) 113   Ht 5' 9" (1.753 m)   Wt 65.9 kg (145 lb 4.5 oz)   LMP 2025 (Exact Date)   BMI 21.45 kg/m²         ROS:  GENERAL: Denies weight gain or weight loss. Feeling well overall.   CHEST: Denies chest pain or shortness of breath.   CARDIOVASCULAR: Denies palpitations or left sided chest pain.   URINARY: No frequency, dysuria, hematuria, or burning on urination.  REPRODUCTIVE: See HPI.   BREASTS: The patient performs breast self-examination and denies pain, lumps, or nipple discharge.   PSYCHIATRIC: Denies depression, anxiety or mood swings.    PE:   APPEARANCE: Well nourished, well developed, in no acute distress.  AFFECT: WNL, alert and oriented x 3.  SKIN: No acne or hirsutism.  EXTREMITIES: No " edema.  Pelvic deferred today      ASSESSMENT and PLAN:  1. Positive pregnancy test  -     POCT Urine Pregnancy    2. Encounter for supervision of normal pregnancy in multigravida  -     HIV 1/2 Ag/Ab (4th Gen); Future; Expected date: 04/10/2025  -     Treponema Pallidium Antibodies IgG, IgM; Future; Expected date: 04/10/2025  -     Type & Screen; Future; Expected date: 04/10/2025  -     Rubella antibody, IgG; Future; Expected date: 04/10/2025  -     Urine culture  -     CBC auto differential; Future; Expected date: 04/10/2025  -     C. trachomatis/N. gonorrhoeae by AMP DNA [facility]; Urine  -     Hemoglobin Electrophoresis,Hgb A2 Abe.; Future; Expected date: 04/10/2025  -     Hemoglobin A1C; Future; Expected date: 04/10/2025  -     Comprehensive Metabolic Panel; Future; Expected date: 04/10/2025  -     Hepatitis Panel, Acute; Future; Expected date: 04/10/2025    3. 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 Encounter to determine fetal viability of pregnancy, single or unspecified fetus  -     US OB/GYN Procedure ([facility])-Future; Future         -      Pap smear N/A  -      Patient's medications and medical history reviewed with patient and implications in pregnancy.   -      Pregnancy course discussed.  -      BMI 21.45    Discussed recommended weight gain of:     Underweight  Less than 18.5 28-40      Normal Weight 18.5-24.9  25-35      Overweight  25-29.9  15-25      Obese    30 and greater  11-20     -      Chromosomal abnormality risk discussed and available testing offered. NIPT testing when appropriate  -      Oriented to practice including CNM collaboration.   New OB labs today  F/u 4 weeks for initial OB visit  Early warning S/S reviewed

## 2025-04-11 ENCOUNTER — RESULTS FOLLOW-UP (OUTPATIENT)
Dept: OBSTETRICS AND GYNECOLOGY | Facility: CLINIC | Age: 20
End: 2025-04-11

## 2025-04-11 PROBLEM — L30.9 ECZEMA: Status: ACTIVE | Noted: 2025-04-11

## 2025-04-11 PROBLEM — O99.019 ANTEPARTUM ANEMIA: Status: ACTIVE | Noted: 2025-04-11

## 2025-04-11 LAB
HGB A2 MFR BLD HPLC: 2.4 % (ref 2.2–3.2)
HGB FRACT BLD ELPH-IMP: NORMAL
PATHOLOGIST INTERPRETATION - HGB SERUM (OHS): NORMAL
RUBV IGG SER-ACNC: 114 IU/ML
RUBV IGG SER-IMP: REACTIVE

## 2025-04-11 RX ORDER — FERROUS SULFATE 325(65) MG
325 TABLET, DELAYED RELEASE (ENTERIC COATED) ORAL DAILY
Qty: 30 TABLET | Refills: 2 | Status: SHIPPED | OUTPATIENT
Start: 2025-04-11 | End: 2025-05-11

## 2025-04-11 RX ORDER — ONDANSETRON 8 MG/1
8 TABLET, ORALLY DISINTEGRATING ORAL EVERY 6 HOURS PRN
Qty: 20 TABLET | Refills: 1 | Status: SHIPPED | OUTPATIENT
Start: 2025-04-11

## 2025-04-12 LAB — BACTERIA UR CULT: NO GROWTH

## 2025-05-04 ENCOUNTER — E-VISIT (OUTPATIENT)
Dept: URGENT CARE | Facility: CLINIC | Age: 20
End: 2025-05-04
Payer: MEDICAID

## 2025-05-04 DIAGNOSIS — N76.0 ACUTE VAGINITIS: Primary | ICD-10-CM

## 2025-05-04 NOTE — PROGRESS NOTES
Patient ID: Rachelle Peters is a 19 y.o. female.    Chief Complaint: General Illness (Entered automatically based on patient selection in [employer].)    The patient initiated a request through [employer] on 5/4/2025 for evaluation and management with a chief complaint of General Illness (Entered automatically based on patient selection in [employer].)     I evaluated the questionnaire submission on 5/4/25.    [facility]    5/4/2025  2:58 PM CDT - Filed by Patient   What do you need help with? Bacterial Vaginosis   Do you agree to participate in an E-Visit? Yes   If you have any of the following symptoms,  please do not complete an E-Visit,  schedule an appointment with your provider: I acknowledge   Do you have any of the following pregnancy-related conditions? Pregnant   What is the main issue you would like addressed today? Bv   Which of the following vaginal concerns do you have? Discharge;  Dryness;  Itching   Do you have vaginal discharge? Yellow/green discharge   Do you have pain while passing urine? No   Do you have any of the following symptoms? Frequent urination    Have you taken antibiotics in the last two weeks? No    Do you use any of the following? Vaginal sprays   Which of the following applies to your menstrual period? Have not had for a while   Which of the following applies to your menstrual cycle? Heavier bleeding than normal   Do you have spotting between periods? Yes   Do you have pain with your period? Yes   What type of pain do you have with your period? Cramping   Have you had similar symptoms in the past? Frequently   When you had similar symptoms in the past, did any of the following work? Taking probiotics   Have you had a temperature of 100.4 or higher? I don't know   Provide any additional information you feel is important. I have bv i have all the symptoms   Please attach any relevant images or files    Are you able to take your vital signs? No          Encounter Diagnosis   Name Primary?    Acute vaginitis Yes        Orders Placed This Encounter   Procedures    Vaginosis Screen by DNA Probe     Standing Status:   Future     Expected Date:   5/4/2025     Expiration Date:   7/3/2026     Release to patient:   Immediate     Send normal result to authorizing provider's In Basket if patient is active on MyChart::   Yes            Follow up in about 2 days (around 5/6/2025).      E-Visit Time Tracking:    Day 1 Time (in minutes): 5    Total Time (in minutes): 5

## 2025-05-08 ENCOUNTER — PROCEDURE VISIT (OUTPATIENT)
Dept: OBSTETRICS AND GYNECOLOGY | Facility: CLINIC | Age: 20
End: 2025-05-08
Payer: MEDICAID

## 2025-05-08 ENCOUNTER — INITIAL PRENATAL (OUTPATIENT)
Dept: OBSTETRICS AND GYNECOLOGY | Facility: CLINIC | Age: 20
End: 2025-05-08
Payer: MEDICAID

## 2025-05-08 ENCOUNTER — LAB VISIT (OUTPATIENT)
Dept: LAB | Facility: HOSPITAL | Age: 20
End: 2025-05-08
Attending: ADVANCED PRACTICE MIDWIFE
Payer: MEDICAID

## 2025-05-08 VITALS
WEIGHT: 149.25 LBS | SYSTOLIC BLOOD PRESSURE: 110 MMHG | BODY MASS INDEX: 22.04 KG/M2 | DIASTOLIC BLOOD PRESSURE: 70 MMHG

## 2025-05-08 DIAGNOSIS — Z34.80 ENCOUNTER FOR SUPERVISION OF NORMAL PREGNANCY IN MULTIGRAVIDA: ICD-10-CM

## 2025-05-08 DIAGNOSIS — N89.8 VAGINAL IRRITATION: ICD-10-CM

## 2025-05-08 DIAGNOSIS — O99.019 ANTEPARTUM ANEMIA: Primary | ICD-10-CM

## 2025-05-08 DIAGNOSIS — R35.0 URINARY FREQUENCY: ICD-10-CM

## 2025-05-08 DIAGNOSIS — O36.80X0 ENCOUNTER TO DETERMINE FETAL VIABILITY OF PREGNANCY, SINGLE OR UNSPECIFIED FETUS: ICD-10-CM

## 2025-05-08 DIAGNOSIS — O99.019 ANTEPARTUM ANEMIA: ICD-10-CM

## 2025-05-08 LAB
BILIRUB UR QL STRIP.AUTO: NEGATIVE
CLARITY UR: ABNORMAL
COLOR UR AUTO: YELLOW
GLUCOSE UR QL STRIP: NEGATIVE
HGB UR QL STRIP: ABNORMAL
KETONES UR QL STRIP: NEGATIVE
LEUKOCYTE ESTERASE UR QL STRIP: NEGATIVE
NITRITE UR QL STRIP: NEGATIVE
PH UR STRIP: 7 [PH]
PROT UR QL STRIP: NEGATIVE
SP GR UR STRIP: 1.02

## 2025-05-08 PROCEDURE — 76801 OB US < 14 WKS SINGLE FETUS: CPT | Mod: PBBFAC | Performed by: OBSTETRICS & GYNECOLOGY

## 2025-05-08 PROCEDURE — 36415 COLL VENOUS BLD VENIPUNCTURE: CPT

## 2025-05-08 PROCEDURE — 99999 PR PBB SHADOW E&M-EST. PATIENT-LVL III: CPT | Mod: PBBFAC,,, | Performed by: ADVANCED PRACTICE MIDWIFE

## 2025-05-08 PROCEDURE — 82728 ASSAY OF FERRITIN: CPT

## 2025-05-08 PROCEDURE — 83540 ASSAY OF IRON: CPT

## 2025-05-08 PROCEDURE — 99213 OFFICE O/P EST LOW 20 MIN: CPT | Mod: PBBFAC,TH,25 | Performed by: ADVANCED PRACTICE MIDWIFE

## 2025-05-08 PROCEDURE — 81003 URINALYSIS AUTO W/O SCOPE: CPT | Performed by: ADVANCED PRACTICE MIDWIFE

## 2025-05-08 RX ORDER — ONDANSETRON 8 MG/1
8 TABLET, ORALLY DISINTEGRATING ORAL EVERY 8 HOURS PRN
Qty: 20 TABLET | Refills: 1 | Status: SHIPPED | OUTPATIENT
Start: 2025-05-08

## 2025-05-08 NOTE — PROGRESS NOTES
19 y.o. female  at 9w4d here for initial OB visit  denies VB or cramping  Reviewed prenatal labs   Genetic testing at [place] if desires  A-Z book given and discussed  Delivery consents signed    Dating US: single viable IUP, embryo grossly WNL with normal cardiac activity. Uterus, cervix and adnexae appear normal. No fluid seen in cul-de-sac. EGA 9w4d with MEGHA 25       1. Antepartum anemia  Overview:  Iron daily  Iron studies 25   Recommended by pathologist who read hgb electrophoresis     Orders:  -     Iron and TIBC; Future; Expected date: 2025  -     Ferritin; Future; Expected date: 2025    2. Encounter for supervision of normal pregnancy in multigravida    3. Vaginal irritation  Overview:  After using new soap  Vaginosis screen obtained      4. Urinary frequency  Overview:  Urine culture obtained    Reviewed warning signs, pregnancy precautions and how/when to call.  RTC x 4 wks, call or present sooner prn.

## 2025-05-09 LAB
FERRITIN SERPL-MCNC: 10 NG/ML (ref 20–300)
IRON SATN MFR SERPL: 23 % (ref 20–50)
IRON SERPL-MCNC: 119 UG/DL (ref 30–160)
TIBC SERPL-MCNC: 518 UG/DL (ref 250–450)
TRANSFERRIN SERPL-MCNC: 350 MG/DL (ref 200–375)

## 2025-05-14 ENCOUNTER — RESULTS FOLLOW-UP (OUTPATIENT)
Dept: OBSTETRICS AND GYNECOLOGY | Facility: CLINIC | Age: 20
End: 2025-05-14

## 2025-05-14 RX ORDER — METRONIDAZOLE 500 MG/1
500 TABLET ORAL EVERY 12 HOURS
Qty: 14 TABLET | Refills: 0 | Status: SHIPPED | OUTPATIENT
Start: 2025-05-14

## 2025-06-18 ENCOUNTER — PATIENT MESSAGE (OUTPATIENT)
Dept: OBSTETRICS AND GYNECOLOGY | Facility: CLINIC | Age: 20
End: 2025-06-18

## 2025-06-18 ENCOUNTER — ROUTINE PRENATAL (OUTPATIENT)
Dept: OBSTETRICS AND GYNECOLOGY | Facility: CLINIC | Age: 20
End: 2025-06-18
Payer: MEDICAID

## 2025-06-18 VITALS
DIASTOLIC BLOOD PRESSURE: 60 MMHG | WEIGHT: 149.69 LBS | SYSTOLIC BLOOD PRESSURE: 116 MMHG | BODY MASS INDEX: 22.11 KG/M2

## 2025-06-18 DIAGNOSIS — Z36.89 ENCOUNTER FOR FETAL ANATOMIC SURVEY: Primary | ICD-10-CM

## 2025-06-18 DIAGNOSIS — Z34.80 ENCOUNTER FOR SUPERVISION OF NORMAL PREGNANCY IN MULTIGRAVIDA: ICD-10-CM

## 2025-06-18 DIAGNOSIS — O99.019 ANTEPARTUM ANEMIA: ICD-10-CM

## 2025-06-18 DIAGNOSIS — N76.0 BV (BACTERIAL VAGINOSIS): ICD-10-CM

## 2025-06-18 DIAGNOSIS — Z3A.15 15 WEEKS GESTATION OF PREGNANCY: ICD-10-CM

## 2025-06-18 DIAGNOSIS — B96.89 BV (BACTERIAL VAGINOSIS): ICD-10-CM

## 2025-06-18 PROCEDURE — 99212 OFFICE O/P EST SF 10 MIN: CPT | Mod: PBBFAC,TH | Performed by: ADVANCED PRACTICE MIDWIFE

## 2025-06-18 PROCEDURE — 99214 OFFICE O/P EST MOD 30 MIN: CPT | Mod: S$PBB,TH,UC, | Performed by: ADVANCED PRACTICE MIDWIFE

## 2025-06-18 PROCEDURE — 99999 PR PBB SHADOW E&M-EST. PATIENT-LVL II: CPT | Mod: PBBFAC,,, | Performed by: ADVANCED PRACTICE MIDWIFE

## 2025-06-18 NOTE — PROGRESS NOTES
19 y.o. female  at 15w3d    feeling flutters, denies VB, LOF or cramping  Doing well without concerns   TW lbs   Genetic testing MAT21 today  Anatomy scan ordered    1. Encounter for fetal anatomic survey  -     US OB/GYN Procedure ([facility])-Future; Future; Expected date: 2025    2. BV (bacterial vaginosis)  Overview:  Treated   Completed treatment      3. Encounter for supervision of normal pregnancy in multigravida  -     Connected MOM Enrollment  -     Assign Connected MOM Program Consent Questionnaire    4. Antepartum anemia  Overview:  Iron daily. Non-compliant.   Iron studies 25, abnormal will repeat with T3 labs      5. 15 weeks gestation of pregnancy  -     Connected MOM Enrollment  -     Assign Connected MOM Program Consent Questionnaire         Reviewed warning signs, pregnancy precautions and how/when to call.  RTC x 4 wks, call or present sooner prn.     FAITH Wright

## 2025-06-22 ENCOUNTER — PATIENT MESSAGE (OUTPATIENT)
Dept: OTHER | Facility: OTHER | Age: 20
End: 2025-06-22
Payer: MEDICAID

## 2025-06-29 ENCOUNTER — PATIENT MESSAGE (OUTPATIENT)
Dept: OTHER | Facility: OTHER | Age: 20
End: 2025-06-29
Payer: MEDICAID

## 2025-07-16 ENCOUNTER — ROUTINE PRENATAL (OUTPATIENT)
Dept: OBSTETRICS AND GYNECOLOGY | Facility: CLINIC | Age: 20
End: 2025-07-16
Payer: MEDICAID

## 2025-07-16 ENCOUNTER — PROCEDURE VISIT (OUTPATIENT)
Dept: OBSTETRICS AND GYNECOLOGY | Facility: CLINIC | Age: 20
End: 2025-07-16
Payer: MEDICAID

## 2025-07-16 VITALS
BODY MASS INDEX: 22.3 KG/M2 | HEART RATE: 92 BPM | DIASTOLIC BLOOD PRESSURE: 65 MMHG | WEIGHT: 151 LBS | SYSTOLIC BLOOD PRESSURE: 110 MMHG

## 2025-07-16 DIAGNOSIS — Z36.2 ENCOUNTER FOR FOLLOW-UP ULTRASOUND OF FETAL ANATOMY: Primary | ICD-10-CM

## 2025-07-16 DIAGNOSIS — Z36.89 ENCOUNTER FOR FETAL ANATOMIC SURVEY: ICD-10-CM

## 2025-07-16 DIAGNOSIS — O28.3 ABNORMAL FETAL ULTRASOUND: ICD-10-CM

## 2025-07-16 DIAGNOSIS — Z34.80 ENCOUNTER FOR SUPERVISION OF NORMAL PREGNANCY IN MULTIGRAVIDA: ICD-10-CM

## 2025-07-16 PROCEDURE — 99213 OFFICE O/P EST LOW 20 MIN: CPT | Mod: PBBFAC,TH,25 | Performed by: ADVANCED PRACTICE MIDWIFE

## 2025-07-16 PROCEDURE — 76811 OB US DETAILED SNGL FETUS: CPT | Mod: PBBFAC | Performed by: OBSTETRICS & GYNECOLOGY

## 2025-07-16 PROCEDURE — 99999 PR PBB SHADOW E&M-EST. PATIENT-LVL III: CPT | Mod: PBBFAC,,, | Performed by: ADVANCED PRACTICE MIDWIFE

## 2025-07-16 NOTE — PROGRESS NOTES
19 y.o. female  at 19w3d    feeling flutters/FM, denies VB, LOF or cramping  Doing well without concerns   TW lbs     Reviewed anatomy US-normal fetal anatomy with no obvious abnormalities. Suboptimal views  S=D. Normal amniotic fluid, normal placental location in anterior position, no evidence of previa. Normal appearing cervical length at 31.7 mm. female gender. 3VC.  gms, Absent nasal bone, Will await [facility] recommendations.    Genetic testing negative    1. Encounter for follow-up ultrasound of fetal anatomy  -     US OB/GYN Procedure ([facility])-Future; Future    2. Encounter for supervision of normal pregnancy in multigravida    3. Abnormal fetal ultrasound  Overview:  Abesent nasal bone  Mat 21 negative  Findings discussed with patient.  Most likely benign finding       Reviewed warning signs, normal FM,  labor precautions and how/when to call.  RTC x 4 wks, call or present sooner prn.

## 2025-07-17 PROBLEM — O28.3 ABNORMAL FETAL ULTRASOUND: Status: ACTIVE | Noted: 2025-07-17

## 2025-07-20 ENCOUNTER — PATIENT MESSAGE (OUTPATIENT)
Dept: OTHER | Facility: OTHER | Age: 20
End: 2025-07-20
Payer: MEDICAID

## 2025-08-13 ENCOUNTER — ROUTINE PRENATAL (OUTPATIENT)
Dept: OBSTETRICS AND GYNECOLOGY | Facility: CLINIC | Age: 20
End: 2025-08-13
Payer: MEDICAID

## 2025-08-13 ENCOUNTER — PROCEDURE VISIT (OUTPATIENT)
Dept: OBSTETRICS AND GYNECOLOGY | Facility: CLINIC | Age: 20
End: 2025-08-13
Payer: MEDICAID

## 2025-08-13 VITALS
WEIGHT: 154.31 LBS | BODY MASS INDEX: 22.79 KG/M2 | DIASTOLIC BLOOD PRESSURE: 62 MMHG | SYSTOLIC BLOOD PRESSURE: 108 MMHG

## 2025-08-13 DIAGNOSIS — Z36.2 ENCOUNTER FOR FOLLOW-UP ULTRASOUND OF FETAL ANATOMY: Primary | ICD-10-CM

## 2025-08-13 DIAGNOSIS — Z36.2 ENCOUNTER FOR FOLLOW-UP ULTRASOUND OF FETAL ANATOMY: ICD-10-CM

## 2025-08-13 DIAGNOSIS — Z3A.28 28 WEEKS GESTATION OF PREGNANCY: ICD-10-CM

## 2025-08-13 DIAGNOSIS — Z34.80 ENCOUNTER FOR SUPERVISION OF NORMAL PREGNANCY IN MULTIGRAVIDA: ICD-10-CM

## 2025-08-13 PROBLEM — N89.8 VAGINAL IRRITATION: Status: RESOLVED | Noted: 2025-05-08 | Resolved: 2025-08-13

## 2025-08-13 PROBLEM — N92.6 IRREGULAR PERIODS: Status: RESOLVED | Noted: 2022-09-12 | Resolved: 2025-08-13

## 2025-08-13 PROBLEM — N76.0 BV (BACTERIAL VAGINOSIS): Status: RESOLVED | Noted: 2025-06-18 | Resolved: 2025-08-13

## 2025-08-13 PROBLEM — R35.0 URINARY FREQUENCY: Status: RESOLVED | Noted: 2025-05-08 | Resolved: 2025-08-13

## 2025-08-13 PROBLEM — B96.89 BV (BACTERIAL VAGINOSIS): Status: RESOLVED | Noted: 2025-06-18 | Resolved: 2025-08-13

## 2025-08-13 PROCEDURE — 99213 OFFICE O/P EST LOW 20 MIN: CPT | Mod: PBBFAC,TH | Performed by: ADVANCED PRACTICE MIDWIFE

## 2025-08-13 PROCEDURE — 99999 PR PBB SHADOW E&M-EST. PATIENT-LVL III: CPT | Mod: PBBFAC,,, | Performed by: ADVANCED PRACTICE MIDWIFE

## 2025-08-13 PROCEDURE — 76816 OB US FOLLOW-UP PER FETUS: CPT | Mod: PBBFAC | Performed by: OBSTETRICS & GYNECOLOGY

## 2025-08-17 ENCOUNTER — PATIENT MESSAGE (OUTPATIENT)
Dept: OTHER | Facility: OTHER | Age: 20
End: 2025-08-17
Payer: MEDICAID